# Patient Record
Sex: FEMALE | Race: BLACK OR AFRICAN AMERICAN | NOT HISPANIC OR LATINO | URBAN - METROPOLITAN AREA
[De-identification: names, ages, dates, MRNs, and addresses within clinical notes are randomized per-mention and may not be internally consistent; named-entity substitution may affect disease eponyms.]

---
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
## 2024-05-30 ENCOUNTER — EMERGENCY (EMERGENCY)
Facility: HOSPITAL | Age: 19
LOS: 1 days | Discharge: HOME | End: 2024-05-30
Attending: EMERGENCY MEDICINE | Admitting: EMERGENCY MEDICINE
Payer: COMMERCIAL

## 2024-05-30 VITALS
RESPIRATION RATE: 16 BRPM | HEART RATE: 71 BPM | TEMPERATURE: 98 F | OXYGEN SATURATION: 100 % | SYSTOLIC BLOOD PRESSURE: 131 MMHG | DIASTOLIC BLOOD PRESSURE: 86 MMHG

## 2024-05-30 LAB
ALBUMIN SERPL ELPH-MCNC: 4 G/DL — SIGNIFICANT CHANGE UP (ref 3.3–5)
ALP SERPL-CCNC: 81 U/L — SIGNIFICANT CHANGE UP (ref 40–120)
ALT FLD-CCNC: 15 U/L — SIGNIFICANT CHANGE UP (ref 4–33)
ANION GAP SERPL CALC-SCNC: 15 MMOL/L — HIGH (ref 7–14)
APPEARANCE UR: CLEAR — SIGNIFICANT CHANGE UP
AST SERPL-CCNC: 29 U/L — SIGNIFICANT CHANGE UP (ref 4–32)
BACTERIA # UR AUTO: ABNORMAL /HPF
BASOPHILS # BLD AUTO: 0.03 K/UL — SIGNIFICANT CHANGE UP (ref 0–0.2)
BASOPHILS NFR BLD AUTO: 0.5 % — SIGNIFICANT CHANGE UP (ref 0–2)
BILIRUB SERPL-MCNC: 0.4 MG/DL — SIGNIFICANT CHANGE UP (ref 0.2–1.2)
BILIRUB UR-MCNC: NEGATIVE — SIGNIFICANT CHANGE UP
BUN SERPL-MCNC: 13 MG/DL — SIGNIFICANT CHANGE UP (ref 7–23)
CALCIUM SERPL-MCNC: 8.6 MG/DL — SIGNIFICANT CHANGE UP (ref 8.4–10.5)
CHLORIDE SERPL-SCNC: 106 MMOL/L — SIGNIFICANT CHANGE UP (ref 98–107)
CO2 SERPL-SCNC: 15 MMOL/L — LOW (ref 22–31)
COLOR SPEC: YELLOW — SIGNIFICANT CHANGE UP
CREAT SERPL-MCNC: 0.88 MG/DL — SIGNIFICANT CHANGE UP (ref 0.5–1.3)
DIFF PNL FLD: NEGATIVE — SIGNIFICANT CHANGE UP
EGFR: 97 ML/MIN/1.73M2 — SIGNIFICANT CHANGE UP
EOSINOPHIL # BLD AUTO: 0.17 K/UL — SIGNIFICANT CHANGE UP (ref 0–0.5)
EOSINOPHIL NFR BLD AUTO: 2.9 % — SIGNIFICANT CHANGE UP (ref 0–6)
GLUCOSE SERPL-MCNC: 84 MG/DL — SIGNIFICANT CHANGE UP (ref 70–99)
GLUCOSE UR QL: NEGATIVE MG/DL — SIGNIFICANT CHANGE UP
HCG SERPL-ACNC: <1 MIU/ML — SIGNIFICANT CHANGE UP
HCT VFR BLD CALC: 39.4 % — SIGNIFICANT CHANGE UP (ref 34.5–45)
HGB BLD-MCNC: 14.2 G/DL — SIGNIFICANT CHANGE UP (ref 11.5–15.5)
HIV 1+2 AB+HIV1 P24 AG SERPL QL IA: SIGNIFICANT CHANGE UP
IANC: 3.47 K/UL — SIGNIFICANT CHANGE UP (ref 1.8–7.4)
IMM GRANULOCYTES NFR BLD AUTO: 0.2 % — SIGNIFICANT CHANGE UP (ref 0–0.9)
KETONES UR-MCNC: NEGATIVE MG/DL — SIGNIFICANT CHANGE UP
LEUKOCYTE ESTERASE UR-ACNC: ABNORMAL
LYMPHOCYTES # BLD AUTO: 1.81 K/UL — SIGNIFICANT CHANGE UP (ref 1–3.3)
LYMPHOCYTES # BLD AUTO: 30.9 % — SIGNIFICANT CHANGE UP (ref 13–44)
MCHC RBC-ENTMCNC: 27.7 PG — SIGNIFICANT CHANGE UP (ref 27–34)
MCHC RBC-ENTMCNC: 36 GM/DL — SIGNIFICANT CHANGE UP (ref 32–36)
MCV RBC AUTO: 77 FL — LOW (ref 80–100)
MONOCYTES # BLD AUTO: 0.37 K/UL — SIGNIFICANT CHANGE UP (ref 0–0.9)
MONOCYTES NFR BLD AUTO: 6.3 % — SIGNIFICANT CHANGE UP (ref 2–14)
NEUTROPHILS # BLD AUTO: 3.47 K/UL — SIGNIFICANT CHANGE UP (ref 1.8–7.4)
NEUTROPHILS NFR BLD AUTO: 59.2 % — SIGNIFICANT CHANGE UP (ref 43–77)
NITRITE UR-MCNC: NEGATIVE — SIGNIFICANT CHANGE UP
NRBC # BLD: 0 /100 WBCS — SIGNIFICANT CHANGE UP (ref 0–0)
NRBC # FLD: 0 K/UL — SIGNIFICANT CHANGE UP (ref 0–0)
PH UR: 6 — SIGNIFICANT CHANGE UP (ref 5–8)
PLATELET # BLD AUTO: 225 K/UL — SIGNIFICANT CHANGE UP (ref 150–400)
POTASSIUM SERPL-MCNC: 5.1 MMOL/L — SIGNIFICANT CHANGE UP (ref 3.5–5.3)
POTASSIUM SERPL-SCNC: 5.1 MMOL/L — SIGNIFICANT CHANGE UP (ref 3.5–5.3)
PROT SERPL-MCNC: 7.1 G/DL — SIGNIFICANT CHANGE UP (ref 6–8.3)
PROT UR-MCNC: NEGATIVE MG/DL — SIGNIFICANT CHANGE UP
RBC # BLD: 5.12 M/UL — SIGNIFICANT CHANGE UP (ref 3.8–5.2)
RBC # FLD: 14 % — SIGNIFICANT CHANGE UP (ref 10.3–14.5)
RBC CASTS # UR COMP ASSIST: 0 /HPF — SIGNIFICANT CHANGE UP (ref 0–4)
REVIEW: SIGNIFICANT CHANGE UP
SODIUM SERPL-SCNC: 136 MMOL/L — SIGNIFICANT CHANGE UP (ref 135–145)
SP GR SPEC: 1.01 — SIGNIFICANT CHANGE UP (ref 1–1.03)
SQUAMOUS # UR AUTO: 7 /HPF — HIGH (ref 0–5)
UROBILINOGEN FLD QL: 0.2 MG/DL — SIGNIFICANT CHANGE UP (ref 0.2–1)
WBC # BLD: 5.86 K/UL — SIGNIFICANT CHANGE UP (ref 3.8–10.5)
WBC # FLD AUTO: 5.86 K/UL — SIGNIFICANT CHANGE UP (ref 3.8–10.5)
WBC UR QL: 4 /HPF — SIGNIFICANT CHANGE UP (ref 0–5)

## 2024-05-30 PROCEDURE — 76830 TRANSVAGINAL US NON-OB: CPT | Mod: 26

## 2024-05-30 PROCEDURE — 99284 EMERGENCY DEPT VISIT MOD MDM: CPT

## 2024-05-30 RX ORDER — KETOROLAC TROMETHAMINE 30 MG/ML
15 SYRINGE (ML) INJECTION ONCE
Refills: 0 | Status: DISCONTINUED | OUTPATIENT
Start: 2024-05-30 | End: 2024-05-30

## 2024-05-30 RX ADMIN — Medication 15 MILLIGRAM(S): at 11:25

## 2024-05-30 NOTE — ED PROVIDER NOTE - PHYSICAL EXAMINATION
GEN - NAD; well appearing; A+O x3   HEAD - NC/AT   EYES- PERRL  ENT: Airway patent, mmm  PULMONARY - non labored breathing   ABDOMEN - +BS, ND, TTP R pelvic area, neg McBurneys, soft, no guarding, no rebound, no masses   PELVIC: chaperone DENISE Hawkins- no CMT, TTP R adnexal, L adnexa WNL, small amount of white/yellow discharge appears physiologic   EXTREMITIES - FROM  NEUROLOGIC - alert, speech clear  PSYCH -nl mood/affect, nl insight.

## 2024-05-30 NOTE — ED PROVIDER NOTE - PATIENT PORTAL LINK FT
You can access the FollowMyHealth Patient Portal offered by [facility] by registering at the following website: [email or web address]. By joining [employer]’s FollowMyHealth portal, you will also be able to view your health information using other applications (apps) compatible with our system.

## 2024-05-30 NOTE — ED PROVIDER NOTE - NSFOLLOWUPINSTRUCTIONS_ED_ALL_ED_FT
Please see attached copy of your blood work and ultrasound.  As discussed, your ultrasound does show a heterogeneous mass on your right ovary approximately 3.7 cm concerning for a dermoid cyst which is a benign mass.  It is important that you follow-up with a gynecologist for further evaluation and monitoring.  If you have sudden increase in pain or pain not responsive to ibuprofen or Tylenol please return to the emergency department immediately as this may be a sign of ovarian torsion.

## 2024-05-30 NOTE — ED ADULT TRIAGE NOTE - CHIEF COMPLAINT QUOTE
Pt c/o rt sided lower abd pain, constant. Endorsing constipation w/ last BM yesterday. Denies fever, chills,  symptoms, n/v. Denies pmhx.

## 2024-05-30 NOTE — ED ADULT NURSE NOTE - OBJECTIVE STATEMENT
received patient to intake for abdominal pain. A&o4, ambulatory, c/o lower abdominal pain with pelvic discomfort. states had unprotected sex, denies any bleeding/sores. Left 20G AC placed, labs sent.

## 2024-05-30 NOTE — ED PROVIDER NOTE - PROGRESS NOTE DETAILS
And mother updated on findings of ultrasound and need for follow-up with a gynecologist for further evaluation.  All questions answered.  Will refer through discharge lounge for Natoynn follow-up.  At this point there is no concern for ovarian torsion as pain much improved with Toradol, no evidence of torsion on ultrasound.  Counseled patient and her mother for signs of torsion and return precautions to the ED.

## 2024-05-30 NOTE — ED PROVIDER NOTE - CLINICAL SUMMARY MEDICAL DECISION MAKING FREE TEXT BOX
Is healthy 18-year-old female history of constipation presents emergency department with 3 days of progressive right lower pelvic pain.  Patient denies any pain or burning with urination, frequency, nausea, vomiting.  Does note constipation, took MiraLAX with minimal relief, last bowel movement was yesterday.  On pelvic exam there is no CMT, physiological discharge, no overt evidence of STI.  Patient is sexually active without protection with a new partner however states they were both tested before engaging in unprotected intercourse. Patient also states that she was treated for chlamydia 3 months ago but that was with a different partner.  LMP 5/1.  Differential includes PID, ectopic pregnancy, torsion, ovarian cyst.  She has no tenderness to her abdomen [place] to raise concern for appendicitis at this time.  Pending labs, endocervical as well as urine cultures for gonorrhea chlamydia, pt consents for HIV and hep C testing, transvaginal ultrasound.

## 2024-05-31 LAB
C TRACH RRNA SPEC QL NAA+PROBE: DETECTED
C TRACH RRNA SPEC QL NAA+PROBE: DETECTED
CULTURE RESULTS: SIGNIFICANT CHANGE UP
HCV AB S/CO SERPL IA: 0.13 S/CO — SIGNIFICANT CHANGE UP (ref 0–0.99)
HCV AB SERPL-IMP: SIGNIFICANT CHANGE UP
N GONORRHOEA RRNA SPEC QL NAA+PROBE: SIGNIFICANT CHANGE UP
N GONORRHOEA RRNA SPEC QL NAA+PROBE: SIGNIFICANT CHANGE UP
SPECIMEN SOURCE: SIGNIFICANT CHANGE UP

## 2024-05-31 NOTE — ED POST DISCHARGE NOTE - ADDITIONAL DOCUMENTATION
5/31/24: Dr Stewart: Spoke with patient regarding positive chlamydia test and prescribed doxycycline to her pharmacy.  Informed her that her partner should also get tested as well.  All questions answered.

## 2024-06-19 ENCOUNTER — APPOINTMENT (OUTPATIENT)
Dept: OBGYN | Facility: CLINIC | Age: 19
End: 2024-06-19

## 2024-07-19 ENCOUNTER — EMERGENCY (EMERGENCY)
Facility: HOSPITAL | Age: 19
LOS: 1 days | Discharge: HOME | End: 2024-07-19
Admitting: EMERGENCY MEDICINE
Payer: COMMERCIAL

## 2024-07-19 VITALS
OXYGEN SATURATION: 97 % | HEIGHT: 63 IN | RESPIRATION RATE: 18 BRPM | SYSTOLIC BLOOD PRESSURE: 116 MMHG | DIASTOLIC BLOOD PRESSURE: 80 MMHG | WEIGHT: 175.05 LBS | HEART RATE: 103 BPM | TEMPERATURE: 99 F

## 2024-07-19 VITALS
TEMPERATURE: 98 F | HEART RATE: 80 BPM | OXYGEN SATURATION: 100 % | SYSTOLIC BLOOD PRESSURE: 117 MMHG | DIASTOLIC BLOOD PRESSURE: 74 MMHG | RESPIRATION RATE: 16 BRPM

## 2024-07-19 LAB
ALBUMIN SERPL ELPH-MCNC: 3.7 G/DL — SIGNIFICANT CHANGE UP (ref 3.3–5)
ALP SERPL-CCNC: 73 U/L — SIGNIFICANT CHANGE UP (ref 40–120)
ALT FLD-CCNC: 11 U/L — SIGNIFICANT CHANGE UP (ref 4–33)
ANION GAP SERPL CALC-SCNC: 13 MMOL/L — SIGNIFICANT CHANGE UP (ref 7–14)
AST SERPL-CCNC: 14 U/L — SIGNIFICANT CHANGE UP (ref 4–32)
BASOPHILS # BLD AUTO: 0.04 K/UL — SIGNIFICANT CHANGE UP (ref 0–0.2)
BASOPHILS NFR BLD AUTO: 0.3 % — SIGNIFICANT CHANGE UP (ref 0–2)
BILIRUB SERPL-MCNC: 0.7 MG/DL — SIGNIFICANT CHANGE UP (ref 0.2–1.2)
BUN SERPL-MCNC: 7 MG/DL — SIGNIFICANT CHANGE UP (ref 7–23)
CALCIUM SERPL-MCNC: 8.8 MG/DL — SIGNIFICANT CHANGE UP (ref 8.4–10.5)
CHLORIDE SERPL-SCNC: 106 MMOL/L — SIGNIFICANT CHANGE UP (ref 98–107)
CO2 SERPL-SCNC: 17 MMOL/L — LOW (ref 22–31)
CREAT SERPL-MCNC: 0.82 MG/DL — SIGNIFICANT CHANGE UP (ref 0.5–1.3)
EGFR: 106 ML/MIN/1.73M2 — SIGNIFICANT CHANGE UP
EOSINOPHIL # BLD AUTO: 0.09 K/UL — SIGNIFICANT CHANGE UP (ref 0–0.5)
EOSINOPHIL NFR BLD AUTO: 0.6 % — SIGNIFICANT CHANGE UP (ref 0–6)
GLUCOSE SERPL-MCNC: 96 MG/DL — SIGNIFICANT CHANGE UP (ref 70–99)
HCT VFR BLD CALC: 34.4 % — LOW (ref 34.5–45)
HETEROPH AB TITR SER AGGL: NEGATIVE — SIGNIFICANT CHANGE UP
HGB BLD-MCNC: 12.4 G/DL — SIGNIFICANT CHANGE UP (ref 11.5–15.5)
IANC: 12.2 K/UL — HIGH (ref 1.8–7.4)
IMM GRANULOCYTES NFR BLD AUTO: 0.7 % — SIGNIFICANT CHANGE UP (ref 0–0.9)
LYMPHOCYTES # BLD AUTO: 1.52 K/UL — SIGNIFICANT CHANGE UP (ref 1–3.3)
LYMPHOCYTES # BLD AUTO: 10 % — LOW (ref 13–44)
MAGNESIUM SERPL-MCNC: 2 MG/DL — SIGNIFICANT CHANGE UP (ref 1.6–2.6)
MCHC RBC-ENTMCNC: 27.6 PG — SIGNIFICANT CHANGE UP (ref 27–34)
MCHC RBC-ENTMCNC: 36 GM/DL — SIGNIFICANT CHANGE UP (ref 32–36)
MCV RBC AUTO: 76.6 FL — LOW (ref 80–100)
MONOCYTES # BLD AUTO: 1.24 K/UL — HIGH (ref 0–0.9)
MONOCYTES NFR BLD AUTO: 8.2 % — SIGNIFICANT CHANGE UP (ref 2–14)
NEUTROPHILS # BLD AUTO: 12.2 K/UL — HIGH (ref 1.8–7.4)
NEUTROPHILS NFR BLD AUTO: 80.2 % — HIGH (ref 43–77)
NRBC # BLD: 0 /100 WBCS — SIGNIFICANT CHANGE UP (ref 0–0)
NRBC # FLD: 0 K/UL — SIGNIFICANT CHANGE UP (ref 0–0)
PHOSPHATE SERPL-MCNC: 3.5 MG/DL — SIGNIFICANT CHANGE UP (ref 2.5–4.5)
PLATELET # BLD AUTO: 254 K/UL — SIGNIFICANT CHANGE UP (ref 150–400)
POTASSIUM SERPL-MCNC: 4.5 MMOL/L — SIGNIFICANT CHANGE UP (ref 3.5–5.3)
POTASSIUM SERPL-SCNC: 4.5 MMOL/L — SIGNIFICANT CHANGE UP (ref 3.5–5.3)
PROT SERPL-MCNC: 6.9 G/DL — SIGNIFICANT CHANGE UP (ref 6–8.3)
RBC # BLD: 4.49 M/UL — SIGNIFICANT CHANGE UP (ref 3.8–5.2)
RBC # FLD: 14.2 % — SIGNIFICANT CHANGE UP (ref 10.3–14.5)
S PYO DNA THROAT QL NAA+PROBE: SIGNIFICANT CHANGE UP
SODIUM SERPL-SCNC: 136 MMOL/L — SIGNIFICANT CHANGE UP (ref 135–145)
WBC # BLD: 15.2 K/UL — HIGH (ref 3.8–10.5)
WBC # FLD AUTO: 15.2 K/UL — HIGH (ref 3.8–10.5)

## 2024-07-19 PROCEDURE — 36000 PLACE NEEDLE IN VEIN: CPT

## 2024-07-19 PROCEDURE — 99285 EMERGENCY DEPT VISIT HI MDM: CPT | Mod: 25

## 2024-07-19 PROCEDURE — 70491 CT SOFT TISSUE NECK W/DYE: CPT | Mod: 26,MC

## 2024-07-19 RX ORDER — ACETAMINOPHEN 325 MG
975 TABLET ORAL ONCE
Refills: 0 | Status: COMPLETED | OUTPATIENT
Start: 2024-07-19 | End: 2024-07-19

## 2024-07-19 RX ORDER — CLINDAMYCIN PHOSPHATE 150 MG/ML
600 INJECTION, SOLUTION INTRAVENOUS ONCE
Refills: 0 | Status: COMPLETED | OUTPATIENT
Start: 2024-07-19 | End: 2024-07-19

## 2024-07-19 RX ORDER — CLINDAMYCIN PHOSPHATE 150 MG/ML
1 INJECTION, SOLUTION INTRAVENOUS
Qty: 30 | Refills: 0
Start: 2024-07-19 | End: 2024-07-28

## 2024-07-19 RX ORDER — ACETAMINOPHEN 325 MG
1000 TABLET ORAL ONCE
Refills: 0 | Status: DISCONTINUED | OUTPATIENT
Start: 2024-07-19 | End: 2024-07-19

## 2024-07-19 RX ORDER — SODIUM CHLORIDE 0.9 % (FLUSH) 0.9 %
1000 SYRINGE (ML) INJECTION ONCE
Refills: 0 | Status: COMPLETED | OUTPATIENT
Start: 2024-07-19 | End: 2024-07-19

## 2024-07-19 RX ORDER — KETOROLAC TROMETHAMINE 30 MG/ML
15 INJECTION, SOLUTION INTRAMUSCULAR ONCE
Refills: 0 | Status: DISCONTINUED | OUTPATIENT
Start: 2024-07-19 | End: 2024-07-19

## 2024-07-19 RX ORDER — LIDOCAINE HCL 28 MG/G
10 GEL TOPICAL ONCE
Refills: 0 | Status: COMPLETED | OUTPATIENT
Start: 2024-07-19 | End: 2024-07-19

## 2024-07-19 RX ADMIN — LIDOCAINE HCL 10 MILLILITER(S): 28 GEL TOPICAL at 12:53

## 2024-07-19 RX ADMIN — Medication 1000 MILLILITER(S): at 17:17

## 2024-07-19 RX ADMIN — Medication 975 MILLIGRAM(S): at 12:52

## 2024-07-19 RX ADMIN — CLINDAMYCIN PHOSPHATE 100 MILLIGRAM(S): 150 INJECTION, SOLUTION INTRAVENOUS at 17:18

## 2024-07-19 NOTE — ED PROVIDER NOTE - CLINICAL SUMMARY MEDICAL DECISION MAKING FREE TEXT BOX
19 y/o F with Asthma ( never intubated,) accompanied by mother p/w sore throat, mild nasal congestion, and occasional dry cough x 2 days    No history of immunocompromise. Nontoxic appearance.  Patient euvolemic with no trismus. No airway compromise. Able to tolerate PO.    Given History and Exam I have low suspicion for this presentation being caused by PTA, RPA, Ludwigs, Epiglottitis or Bacterial Tracheitis, EBV, acute HIV, Strep throat.    pt given vicious lidocaine and began retching with 1 episode of NBNB emesis; resolved quickly after spitting up medication and did not reoccur  Intially pt wanted Po medication as did not want IV but discussed if labs abnormal or if sxs worsen may need to get IV    plan: labs, pain control, strep swab, reassess

## 2024-07-19 NOTE — ED PROVIDER NOTE - PATIENT PORTAL LINK FT
You can access the FollowMyHealth Patient Portal offered by [facility] by registering at the following website: [email or web address]. By joining [employer]’s FollowMyHealth portal, you will also be able to view your health information using other applications (apps) compatible with our system.

## 2024-07-19 NOTE — ED PROCEDURE NOTE - ATTENDING CONTRIBUTION TO CARE
Dr. Miller: I personally supervised this procedure performed by the resident and I agree with the above documentation.

## 2024-07-19 NOTE — ED PROVIDER NOTE - NSFOLLOWUPINSTRUCTIONS_ED_ALL_ED_FT
Some supportive therapies, including using a cool-mist vaporizer/humidifer/steam from hot showers, limit talking, throat lozenges and mouthwashes once daily, gargling with warm saltwater, advancement of fluids as tolerated, nasal saline sprays, rest, acetaminophen or ibuprofen as directed prn for pain control, frequent handwashing, and boiling/disposing of contaminated toothbrushes.  Please return if any new/worsening/concerning symptoms.    Sore Throat  When you have a sore throat, your throat may feel:  Tender.  Burning.  Irritated.  Scratchy.  Painful when you swallow.  Painful when you talk.  Many things can cause a sore throat, such as:  An infection.  Allergies.  Dry air.  Smoke or pollution.  Radiation treatment for cancer.  Gastroesophageal reflux disease (GERD).  A tumor.  A sore throat can be the first sign of another sickness. It can happen with other problems, like:  Coughing.  Sneezing.  Fever.  Swelling of the glands in the neck.  Most sore throats go away without treatment.    Follow these instructions at home:  Juice, water, and tea.   A do not smoke cigarettes sign.   Medicines    Take over-the-counter and prescription medicines only as told by your doctor.  Children often get sore throats. Do not give your child aspirin.  Use throat sprays to soothe your throat as told by your health care provider.  Managing pain    To help with pain:  Sip warm liquids, such as broth, herbal tea, or warm water.  Eat or drink cold or frozen liquids, such as frozen ice pops.  Rinse your mouth (gargle) with a salt water mixture 3–4 times a day or as needed.  To make salt water, dissolve ½–1 tsp (3–6 g) of salt in 1 cup (237 mL) of warm water.  Do not swallow this mixture.  Suck on hard candy or throat lozenges.  Put a cool-mist humidifier in your bedroom at night.  Sit in the bathroom with the door closed for 5–10 minutes while you run hot water in the shower.  General instructions    Do not smoke or use any products that contain nicotine or tobacco. If you need help quitting, ask your doctor.  Get plenty of rest.  Drink enough fluid to keep your pee (urine) pale yellow.  Wash your hands often for at least 20 seconds with soap and water. If soap and water are not available, use hand .  Contact a doctor if:  You have a fever for more than 2–3 days.  You keep having symptoms for more than 2–3 days.  Your throat does not get better in 7 days.  You have a fever and your symptoms suddenly get worse.  Your child who is 3 months to 3 years old has a temperature of 102.2°F (39°C) or higher.  Get help right away if:  You have trouble breathing.  You cannot swallow fluids, soft foods, or your spit.  You have swelling in your throat or neck that gets worse.  You feel like you may vomit (nauseous) and this feeling lasts a long time.  You cannot stop vomiting.  These symptoms may be an emergency. Get help right away. Call your local emergency services (911 in the [place]  Do not wait to see if the symptoms will go away.  Do not drive yourself to the hospital.  Summary  A sore throat is a painful, burning, irritated, or scratchy throat. Many things can cause a sore throat.  Take over-the-counter medicines only as told by your doctor.  Get plenty of rest.  Drink enough fluid to keep your pee (urine) pale yellow.  Contact a doctor if your symptoms get worse or your sore throat does not get better within 7 days.  This information is not intended to replace advice given to you by your health care provider. Make sure you discuss any questions you have with your health care provider.

## 2024-07-19 NOTE — ED ADULT NURSE NOTE - OBJECTIVE STATEMENT
Patient A&o X4, received in fast track , with complaints of sore throat. Patient states, "I was diagnosed with tonsillitis last month and now I am having the same symptoms". Patient complains of sore throat and pain when swallowing, denies any difficulty breathing at this time. Patient admits to completing course of antibiotics one month ago. Patient able to speak in clear sentences, respirations equal and unlabored. Lung sounds clear b/l, equal chest rise and fall noted. Denies CP/SOB, fever, chills, nausea, vomiting and diarrhea at this time. Skin warm and dry. Provider at bedside for eval, pending further orders.

## 2024-07-19 NOTE — ED PROVIDER NOTE - PROGRESS NOTE DETAILS
CHELSEA Kramer: pt no longer with nausea. IV assess done as pt's mother states she feels pt's voice is muffled not hoarse therefore will obtain CT neck. Discussed NPO for now CHELSEA Kramer: discussed mono test is negative. Patient was given IV clindamycin given WBC 15.  Discussed CT no emergent findings however "Nasopharyngeal tonsillar and to lesser extent soft palate swelling consistent with infection/inflammation. No abscess."  Patient states she feels better appears better than when she initially came in.  Requesting to eat and is tolerating p.o. intake as mother gave her [employer] sandwich.  Will discharge home with clindamycin p.o. and ENT referral as patient does not wish to stay in CDU/ hospital for IV antibiotics.  Discussed strict return precautions and prompt follow-up.  Patient verbalized an understanding and agrees with the plan. CHELSEA Kramer: pt no longer with nausea. IV assess done as pt's mother states she feels pt's voice is muffled not hoarse therefore will obtain CT neck. Discussed NPO for now. Pt and pt's mother do not want flu/covid test, state they tested for covid and do not believe that is what she has. Pt mother was not in room spoke to pt about risk of STi such as gonorrhea/chlamdyia in pharnynx from oral intercourse but pt states she is not considered as does not have risks and does not want to be tested.

## 2024-07-19 NOTE — ED PROVIDER NOTE - OBJECTIVE STATEMENT
19 y/o F with Asthma ( never intubated,) accompanied by mother p/w sore throat, mild nasal congestion, and occasional dry cough x 2 days    Pt states she went to urgent care last month with similar sxs and was told after testing negative for strep, flu, Covid that she had tonsilitis and was treated with clindamycin for 1 week which she took as prescribed. States those sxs resolved after a few days of treatment completely so is concerned that they started again 1 month later. No recent travel. No ill contacts.     Denies fevers, chills, muffle voice, drooling, neck stiffness/pain, chest pain, shortness of breath, palpitations, abdominal pain, nausea, vomiting, diarrhea, dysuria, hematuria/ dark colored urine, pelvic pain, vaginal discharge, rash, dizziness, syncope. 17 y/o F with Asthma ( never intubated,) accompanied by mother p/w sore throat, and mild nasal congestion x 2 days    Pt states she went to urgent care last month with similar sxs and was told after testing negative for strep, flu, Covid that she had tonsilitis and was treated with clindamycin for 1 week which she took as prescribed. States those sxs resolved after a few days of treatment completely so is concerned that they started again 1 month later. No recent travel. No ill contacts.     Denies fevers, chills, muffle voice, drooling, neck stiffness/pain, chest pain, shortness of breath, palpitations, abdominal pain, nausea, vomiting, diarrhea, dysuria, hematuria/ dark colored urine, pelvic pain, vaginal discharge, rash, dizziness, syncope.

## 2024-07-19 NOTE — ED ADULT TRIAGE NOTE - CHIEF COMPLAINT QUOTE
patient states" I am having tonsilitis since last month and I was given antibiotics for that and now it came back again." c/o sore throat, stuffy nose, tonsilitis. denies fever